# Patient Record
Sex: MALE | Race: BLACK OR AFRICAN AMERICAN | NOT HISPANIC OR LATINO | Employment: FULL TIME | URBAN - METROPOLITAN AREA
[De-identification: names, ages, dates, MRNs, and addresses within clinical notes are randomized per-mention and may not be internally consistent; named-entity substitution may affect disease eponyms.]

---

## 2024-07-08 ENCOUNTER — OFFICE VISIT (OUTPATIENT)
Dept: INTERNAL MEDICINE | Facility: CLINIC | Age: 54
End: 2024-07-08
Payer: COMMERCIAL

## 2024-07-08 ENCOUNTER — HOSPITAL ENCOUNTER (OUTPATIENT)
Dept: CARDIOLOGY | Facility: HOSPITAL | Age: 54
Discharge: HOME | End: 2024-07-08
Attending: INTERNAL MEDICINE
Payer: COMMERCIAL

## 2024-07-08 VITALS
WEIGHT: 237.88 LBS | OXYGEN SATURATION: 98 % | SYSTOLIC BLOOD PRESSURE: 126 MMHG | DIASTOLIC BLOOD PRESSURE: 86 MMHG | HEART RATE: 82 BPM | TEMPERATURE: 98 F | BODY MASS INDEX: 28.09 KG/M2 | HEIGHT: 77 IN

## 2024-07-08 DIAGNOSIS — M67.40 GANGLION CYST: ICD-10-CM

## 2024-07-08 DIAGNOSIS — Z00.00 PREVENTATIVE HEALTH CARE: ICD-10-CM

## 2024-07-08 DIAGNOSIS — Z01.818 PRE-OP EXAM: ICD-10-CM

## 2024-07-08 DIAGNOSIS — R94.31 ABNORMAL EKG: ICD-10-CM

## 2024-07-08 DIAGNOSIS — Z12.11 COLON CANCER SCREENING: ICD-10-CM

## 2024-07-08 DIAGNOSIS — M65.322 TRIGGER FINGER, LEFT INDEX FINGER: Primary | ICD-10-CM

## 2024-07-08 LAB
OHS QRS DURATION: 88 MS
OHS QTC CALCULATION: 383 MS

## 2024-07-08 PROCEDURE — 93010 ELECTROCARDIOGRAM REPORT: CPT | Mod: ,,, | Performed by: INTERNAL MEDICINE

## 2024-07-08 PROCEDURE — 1159F MED LIST DOCD IN RCRD: CPT | Mod: CPTII,S$GLB,, | Performed by: INTERNAL MEDICINE

## 2024-07-08 PROCEDURE — 93005 ELECTROCARDIOGRAM TRACING: CPT

## 2024-07-08 PROCEDURE — 3079F DIAST BP 80-89 MM HG: CPT | Mod: CPTII,S$GLB,, | Performed by: INTERNAL MEDICINE

## 2024-07-08 PROCEDURE — 99203 OFFICE O/P NEW LOW 30 MIN: CPT | Mod: S$GLB,,, | Performed by: INTERNAL MEDICINE

## 2024-07-08 PROCEDURE — 99999 PR PBB SHADOW E&M-NEW PATIENT-LVL IV: CPT | Mod: PBBFAC,,, | Performed by: INTERNAL MEDICINE

## 2024-07-08 PROCEDURE — 3008F BODY MASS INDEX DOCD: CPT | Mod: CPTII,S$GLB,, | Performed by: INTERNAL MEDICINE

## 2024-07-08 PROCEDURE — 3074F SYST BP LT 130 MM HG: CPT | Mod: CPTII,S$GLB,, | Performed by: INTERNAL MEDICINE

## 2024-07-08 NOTE — LETTER
July 8, 2024      The [address]  [address] [facility]  [place] [address]  Phone: [phone]  Fax: [phone]       Patient: Braxton Gee   YOB: 1970  Date of Visit: 07/08/2024    To Whom It May Concern:    Kiko Gee  was at [facility] on 07/08/2024. . If you have any questions or concerns, or if I can be of further assistance, please do not hesitate to contact me.    Sincerely,    Hailey Tinajero LPN

## 2024-07-08 NOTE — PROGRESS NOTES
"Subjective:      Patient ID: Braxton Gee is a 53 y.o. male.    Chief Complaint: Pre-op Exam    HPI    52 yo with There is no problem list on file for this patient.    History reviewed. No pertinent past medical history.    Here today for preop exam for left trigger finger release and removal of ganglion cyst planned with Dr. Arce, fax [phone]    He is able to walk up at least 2 flights of stairs without dyspnea or CP.        No current outpatient medications on file.     No current facility-administered medications for this visit.     Review of patient's allergies indicates:  No Known Allergies    family history is not on file.    Review of Systems   Constitutional:  Negative for chills and fever.   HENT:  Negative for ear pain and sore throat.    Respiratory:  Negative for cough.    Cardiovascular:  Negative for chest pain.   Gastrointestinal:  Negative for abdominal pain and blood in stool.   Genitourinary:  Negative for dysuria and hematuria.   Neurological:  Negative for seizures and syncope.     Objective:   /86 (BP Location: Right arm, Patient Position: Sitting, BP Method: Large (Manual))   Pulse 82   Temp 97.5 °F (36.4 °C) (Tympanic)   Ht 6' 5" (1.956 m)   Wt 107.9 kg (237 lb 14 oz)   SpO2 98%   BMI 28.21 kg/m²     Physical Exam  Constitutional:       General: He is not in acute distress.     Appearance: He is well-developed.   HENT:      Head: Normocephalic and atraumatic.   Eyes:      Extraocular Movements: Extraocular movements intact.   Neck:      Thyroid: No thyromegaly.   Cardiovascular:      Rate and Rhythm: Normal rate and regular rhythm.   Pulmonary:      Breath sounds: Normal breath sounds. No wheezing or rales.   Abdominal:      General: Bowel sounds are normal.      Palpations: Abdomen is soft.      Tenderness: There is no abdominal tenderness.   Musculoskeletal:         General: No swelling.      Cervical back: Neck supple. No rigidity.   Lymphadenopathy:      Cervical: No " cervical adenopathy.   Skin:     General: Skin is warm and dry.   Neurological:      Mental Status: He is alert and oriented to person, place, and time.   Psychiatric:         Behavior: Behavior normal.         Lab Results   Component Value Date    CHOL 159 09/29/2020    TRIG 70 09/29/2020    HDL 47 09/29/2020    LDLCALC 98 09/29/2020          The ASCVD Risk score (uJve DK, et al., 2019) failed to calculate for the following reasons:    Cannot find a previous HDL lab    Cannot find a previous total cholesterol lab     Assessment:     1. Trigger finger, left index finger    2. Ganglion cyst    3. Preventative health care    4. Colon cancer screening    5. Abnormal EKG    6. Pre-op exam      Plan:   1. Trigger finger, left index finger    2. Ganglion cyst    3. Preventative health care  -     TSH; Future; Expected date: 07/08/2024  -     Lipid Panel; Future; Expected date: 07/08/2024  -     Hepatitis C Antibody; Future; Expected date: 07/08/2024  -     HIV 1/2 Ag/Ab (4th Gen); Future; Expected date: 07/08/2024  -     Hemoglobin A1C; Future; Expected date: 07/08/2024  -     PSA, Screening; Future; Expected date: 07/08/2024    4. Colon cancer screening  -     Ambulatory referral/consult to Endo Procedure ; Future; Expected date: 07/09/2024    5. Abnormal EKG  -     Ambulatory referral/consult to Cardiology; Future; Expected date: 07/15/2024    6. Pre-op exam  -     CBC W/ AUTO DIFFERENTIAL; Future; Expected date: 07/08/2024  -     COMPREHENSIVE METABOLIC PANEL; Future; Expected date: 07/08/2024  -     SCHEDULED EKG 12-LEAD (to Muse); Future  -     Ambulatory referral/consult to Cardiology; Future; Expected date: 07/15/2024      Preop addendum to be completed once above resulted  There are no Patient Instructions on file for this visit.    Future Appointments   Date Time Provider Department Center   8/8/2024  4:00 PM Morgan Alves MD [facility]           Follow up in about 4 weeks (around  8/5/2024), or if symptoms worsen or fail to improve.

## 2024-07-11 DIAGNOSIS — D64.9 NORMOCYTIC ANEMIA: Primary | ICD-10-CM

## 2024-07-12 ENCOUNTER — OFFICE VISIT (OUTPATIENT)
Dept: CARDIOLOGY | Facility: CLINIC | Age: 54
End: 2024-07-12
Payer: COMMERCIAL

## 2024-07-12 VITALS
SYSTOLIC BLOOD PRESSURE: 137 MMHG | BODY MASS INDEX: 27.07 KG/M2 | HEART RATE: 74 BPM | WEIGHT: 229.25 LBS | HEIGHT: 77 IN | DIASTOLIC BLOOD PRESSURE: 81 MMHG

## 2024-07-12 DIAGNOSIS — Z01.810 PREOP CARDIOVASCULAR EXAM: Primary | ICD-10-CM

## 2024-07-12 DIAGNOSIS — R94.31 ABNORMAL EKG: ICD-10-CM

## 2024-07-12 DIAGNOSIS — Z82.49 FAMILY HISTORY OF CORONARY ARTERY DISEASE: ICD-10-CM

## 2024-07-12 DIAGNOSIS — Z01.818 PRE-OP EXAM: ICD-10-CM

## 2024-07-12 PROCEDURE — 99999 PR PBB SHADOW E&M-EST. PATIENT-LVL IV: CPT | Mod: PBBFAC,,, | Performed by: INTERNAL MEDICINE

## 2024-07-12 NOTE — PROGRESS NOTES
Subjective:   Patient ID:  Braxton Gee is a 53 y.o. male who presents for cardiac consult of Pre-op Exam (Upcoming left hand procedure scheduled for Wednesday 07/17/24.), Abnormal ECG, and Annual Exam      Referral by: Morgan Alves [place]  [id number] [facility]  [place] [address]     Reason for consult:       HPI  The patient came in today for cardiac consult of Pre-op Exam (Upcoming left hand procedure scheduled for Wednesday 07/17/24.), Abnormal ECG, and Annual Exam      Braxton Gee is a 53 y.o. male pt without much PMHx here for preop eval.       preop exam for left trigger finger release and removal of ganglion cyst planned with Dr. Arce, fax [phone]. [phone]   He is a . Overall very active.   ECG - sinus courtney, poor RWP     FH - mother - had CABG x 3 at age 82, brother - CVD, sister -     Sinus bradycardia   Cannot rule out Anterior infarct ,age undetermined   Abnormal ECG   No previous ECGs available   Confirmed by SONU JOSEPH MD ([id number]) on 7/8/2024 3:12:16 PM     No cardiac monitor results found for the past 12 months         History reviewed. No pertinent past medical history.    History reviewed. No pertinent surgical history.    Social History     Tobacco Use    Smoking status: Never     Passive exposure: Never    Smokeless tobacco: Never   Substance Use Topics    Alcohol use: Yes    Drug use: Never       No family history on file.    Patient's Medications    No medications on file       Review of Systems   Constitutional: Negative.    HENT: Negative.     Eyes: Negative.    Respiratory: Negative.     Cardiovascular: Negative.    Gastrointestinal: Negative.    Genitourinary: Negative.    Musculoskeletal:  Positive for joint pain.   Skin: Negative.    Neurological: Negative.    Endo/Heme/Allergies: Negative.    Psychiatric/Behavioral: Negative.     All 12 systems otherwise negative.      Wt Readings from Last 3 Encounters:   07/12/24 104 kg (229 lb 4.5 oz)   07/08/24 107.9 kg (237  "lb 14 oz)     Temp Readings from Last 3 Encounters:   07/08/24 97.5 °F (36.4 °C) (Tympanic)     BP Readings from Last 3 Encounters:   07/12/24 137/81   07/08/24 126/86     Pulse Readings from Last 3 Encounters:   07/12/24 74   07/08/24 82       /81 (BP Location: Left arm, Patient Position: Sitting, BP Method: Medium (Automatic))   Pulse 74   Ht 6' 5" (1.956 m)   Wt 104 kg (229 lb 4.5 oz)   BMI 27.19 kg/m²     Objective:   Physical Exam  Vitals and nursing note reviewed.   Constitutional:       General: He is not in acute distress.     Appearance: He is well-developed. He is not diaphoretic.   HENT:      Head: Normocephalic and atraumatic.      Nose: Nose normal.   Eyes:      General: No scleral icterus.     Conjunctiva/sclera: Conjunctivae normal.   Neck:      Thyroid: No thyromegaly.      Vascular: No JVD.   Cardiovascular:      Rate and Rhythm: Normal rate and regular rhythm.      Heart sounds: S1 normal and S2 normal. No murmur heard.     No friction rub. No gallop. No S3 or S4 sounds.   Pulmonary:      Effort: Pulmonary effort is normal. No respiratory distress.      Breath sounds: Normal breath sounds. No stridor. No wheezing or rales.   Chest:      Chest wall: No tenderness.   Abdominal:      General: Bowel sounds are normal. There is no distension.      Palpations: Abdomen is soft. There is no mass.      Tenderness: There is no abdominal tenderness. There is no rebound.   Genitourinary:     Comments: Deferred  Musculoskeletal:         General: No tenderness or deformity. Normal range of motion.      Cervical back: Normal range of motion and neck supple.   Lymphadenopathy:      Cervical: No cervical adenopathy.   Skin:     General: Skin is warm and dry.      Coloration: Skin is not pale.      Findings: No erythema or rash.   Neurological:      Mental Status: He is alert and oriented to person, place, and time.      Motor: No abnormal muscle tone.      Coordination: Coordination normal.   Psychiatric: " "        Behavior: Behavior normal.         Thought Content: Thought content normal.         Judgment: Judgment normal.         Lab Results   Component Value Date     07/08/2024    K 4.1 07/08/2024     07/08/2024    CO2 26 07/08/2024    BUN 10 07/08/2024    CREATININE 1.1 07/08/2024    GLU 81 07/08/2024    HGBA1C 5.5 07/08/2024    AST 26 07/08/2024    ALT 29 07/08/2024    ALBUMIN 3.9 07/08/2024    PROT 6.9 07/08/2024    BILITOT 0.8 07/08/2024    WBC 4.18 07/08/2024    HGB 12.3 (L) 07/08/2024    HCT 37.4 (L) 07/08/2024    MCV 90 07/08/2024     07/08/2024    TSH 1.146 07/08/2024    CHOL 172 07/08/2024    HDL 48 07/08/2024    LDLCALC 109.2 07/08/2024    TRIG 74 07/08/2024         No results found for: "BNP", "INR"       Assessment:      1. Preop cardiovascular exam    2. Pre-op exam    3. Abnormal EKG    4. Family history of coronary artery disease        Plan:     1.Pre-OP CV evaluation prior to left trigger finger release and removal of ganglion cyst planned with Dr. Arce  Low periop risk of CV events for moderate risk procedure.  Good functional and exercise capacity.  No chest pain, active arrhythmia and CHF symptoms.  Ok to proceed to the scheduled surgery without further cardiac study.    2. Family history of CAD  - will need further workup      Thank you for allowing me to participate in this patient's care. Please do not hesitate to contact me with any questions or concerns. Consult note has been forwarded to the referral physician.     Antione Kaur MD, [facility]  Cardiovascular Disease  [facility], [place]  [phone] (P)     "

## 2024-08-05 ENCOUNTER — TELEPHONE (OUTPATIENT)
Dept: INTERNAL MEDICINE | Facility: CLINIC | Age: 54
End: 2024-08-05
Payer: COMMERCIAL

## 2024-08-19 ENCOUNTER — OFFICE VISIT (OUTPATIENT)
Dept: URGENT CARE | Facility: CLINIC | Age: 54
End: 2024-08-19
Payer: COMMERCIAL

## 2024-08-19 VITALS
SYSTOLIC BLOOD PRESSURE: 128 MMHG | HEART RATE: 67 BPM | BODY MASS INDEX: 27.04 KG/M2 | DIASTOLIC BLOOD PRESSURE: 63 MMHG | OXYGEN SATURATION: 99 % | WEIGHT: 229 LBS | HEIGHT: 77 IN | RESPIRATION RATE: 18 BRPM | TEMPERATURE: 98 F

## 2024-08-19 DIAGNOSIS — M54.50 ACUTE RIGHT-SIDED LOW BACK PAIN WITHOUT SCIATICA: Primary | ICD-10-CM

## 2024-08-19 PROCEDURE — 99213 OFFICE O/P EST LOW 20 MIN: CPT | Mod: 25,S$GLB,, | Performed by: PHYSICIAN ASSISTANT

## 2024-08-19 PROCEDURE — 96372 THER/PROPH/DIAG INJ SC/IM: CPT | Mod: S$GLB,,, | Performed by: PHYSICIAN ASSISTANT

## 2024-08-19 RX ORDER — METHOCARBAMOL 750 MG/1
750 TABLET, FILM COATED ORAL 3 TIMES DAILY
Qty: 30 TABLET | Refills: 0 | Status: SHIPPED | OUTPATIENT
Start: 2024-08-19 | End: 2024-08-29

## 2024-08-19 RX ORDER — KETOROLAC TROMETHAMINE 30 MG/ML
30 INJECTION, SOLUTION INTRAMUSCULAR; INTRAVENOUS
Status: COMPLETED | OUTPATIENT
Start: 2024-08-19 | End: 2024-08-19

## 2024-08-19 RX ADMIN — KETOROLAC TROMETHAMINE 30 MG: 30 INJECTION, SOLUTION INTRAMUSCULAR; INTRAVENOUS at 08:08

## 2024-08-19 NOTE — LETTER
August 19, 2024      [facility]  [address], [address]  [place] [address]  Phone: [phone]       Patient: Braxton Gee   YOB: 1970  Date of Visit: 08/19/2024    To Whom It May Concern:    Kiko Gee  was at [facility] on 08/19/2024. The patient may return to work/school on 8/21/24 with no restrictions. If you have any questions or concerns, or if I can be of further assistance, please do not hesitate to contact me.    Sincerely,      Darlene Jacques PA-C

## 2024-08-19 NOTE — PROGRESS NOTES
"Subjective:      Patient ID: Braxton Gee is a 54 y.o. male.    Vitals:  height is 6' 5" (1.956 m) and weight is 103.9 kg (229 lb). His oral temperature is 97.8 °F (36.6 °C). His blood pressure is 128/63 and his pulse is 67. His respiration is 18 and oxygen saturation is 99%.     Chief Complaint: Back Pain    C/o lower right back pain x 2 days, rates pain 7/10. Pt was lifting a bag of mulch at his home and felt a pain in his lower back. He describes picking up the bag from the ground, stood up, and twisted at the waist.  He states when he did that, he immediately felt the pain.  He has taken OTC meds with some relief.  He denies numbness/tingling or radiation of pain.  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    Back Pain  This is a new problem. The current episode started in the past 7 days. The problem occurs constantly. The problem is unchanged. The pain is present in the lumbar spine. The quality of the pain is described as aching. The pain does not radiate. The pain is at a severity of 7/10. The pain is mild. The pain is The same all the time. The symptoms are aggravated by bending, position and twisting. Stiffness is present All day. Pertinent negatives include no abdominal pain, bladder incontinence, bowel incontinence, chest pain, dysuria, fever, headaches, leg pain, numbness, paresis, paresthesias, pelvic pain, perianal numbness, tingling, weakness or weight loss. Treatments tried: Tylenol. The treatment provided mild relief.       Constitution: Negative for fever.   Cardiovascular:  Negative for chest pain.   Gastrointestinal:  Negative for abdominal pain and bowel incontinence.   Genitourinary:  Negative for dysuria, bladder incontinence and pelvic pain.   Musculoskeletal:  Positive for back pain.   Neurological:  Negative for headaches and numbness.      Objective:     Physical Exam   Constitutional: He is oriented to person, place, and time. He appears " well-developed. He is cooperative. No distress.   HENT:   Head: Normocephalic and atraumatic.   Nose: Nose normal.   Mouth/Throat: Oropharynx is clear and moist and mucous membranes are normal.   Eyes: Conjunctivae and lids are normal.   Neck: Trachea normal and phonation normal. Neck supple.   Cardiovascular: Normal rate, regular rhythm, normal heart sounds and normal pulses.   Pulmonary/Chest: Effort normal and breath sounds normal.   Abdominal: Normal appearance.   Musculoskeletal:         General: No deformity.        Back:    Neurological: He is alert and oriented to person, place, and time. He has normal strength and normal reflexes. No sensory deficit.   Skin: Skin is warm, dry, intact and not diaphoretic.   Psychiatric: His speech is normal and behavior is normal. Judgment and thought content normal.   Nursing note and vitals reviewed.      Assessment:     1. Acute right-sided low back pain without sciatica        Plan:   VSS. Patient non-toxic appearing. Discussed medication being prescribed.  Advised patient to follow up with PCP as needed.  Patient verbalized understanding, agrees with the plan, and is comfortable with discharge.      Acute right-sided low back pain without sciatica  -     ketorolac injection 30 mg    Other orders  -     methocarbamoL (ROBAXIN) 750 MG Tab; Take 1 tablet (750 mg total) by mouth 3 (three) times daily. for 10 days  Dispense: 30 tablet; Refill: 0

## 2024-08-20 ENCOUNTER — TELEPHONE (OUTPATIENT)
Dept: URGENT CARE | Facility: CLINIC | Age: 54
End: 2024-08-20
Payer: COMMERCIAL

## 2024-08-20 NOTE — TELEPHONE ENCOUNTER
"Spoke with patient over the phone. All good, no complaints. Patient stated:   "everything was perfect".  "